# Patient Record
Sex: FEMALE | Race: WHITE | NOT HISPANIC OR LATINO | Employment: UNEMPLOYED | URBAN - METROPOLITAN AREA
[De-identification: names, ages, dates, MRNs, and addresses within clinical notes are randomized per-mention and may not be internally consistent; named-entity substitution may affect disease eponyms.]

---

## 2022-07-14 ENCOUNTER — OFFICE VISIT (OUTPATIENT)
Dept: FAMILY MEDICINE CLINIC | Facility: CLINIC | Age: 32
End: 2022-07-14

## 2022-07-14 VITALS
DIASTOLIC BLOOD PRESSURE: 74 MMHG | TEMPERATURE: 98 F | HEIGHT: 65 IN | BODY MASS INDEX: 18.83 KG/M2 | OXYGEN SATURATION: 94 % | HEART RATE: 125 BPM | WEIGHT: 113 LBS | SYSTOLIC BLOOD PRESSURE: 104 MMHG

## 2022-07-14 DIAGNOSIS — R53.1 GENERALIZED WEAKNESS: ICD-10-CM

## 2022-07-14 DIAGNOSIS — Z00.01 ENCOUNTER FOR GENERAL ADULT MEDICAL EXAMINATION WITH ABNORMAL FINDINGS: Primary | ICD-10-CM

## 2022-07-14 DIAGNOSIS — F41.9 ANXIETY: ICD-10-CM

## 2022-07-14 DIAGNOSIS — G62.1 ALCOHOL-INDUCED POLYNEUROPATHY: ICD-10-CM

## 2022-07-14 LAB
ALBUMIN SERPL-MCNC: 3.9 G/DL (ref 3.5–5.2)
ALBUMIN/GLOB SERPL: 1.2 G/DL
ALP SERPL-CCNC: 185 U/L (ref 39–117)
ALT SERPL W P-5'-P-CCNC: 77 U/L (ref 1–33)
ANION GAP SERPL CALCULATED.3IONS-SCNC: 14.9 MMOL/L (ref 5–15)
ANISOCYTOSIS BLD QL: ABNORMAL
AST SERPL-CCNC: 134 U/L (ref 1–32)
BILIRUB SERPL-MCNC: 0.3 MG/DL (ref 0–1.2)
BUN SERPL-MCNC: 8 MG/DL (ref 6–20)
BUN/CREAT SERPL: 14 (ref 7–25)
CALCIUM SPEC-SCNC: 8.8 MG/DL (ref 8.6–10.5)
CHLORIDE SERPL-SCNC: 100 MMOL/L (ref 98–107)
CHOLEST SERPL-MCNC: 128 MG/DL (ref 0–200)
CO2 SERPL-SCNC: 23.1 MMOL/L (ref 22–29)
CREAT SERPL-MCNC: 0.57 MG/DL (ref 0.57–1)
DEPRECATED RDW RBC AUTO: 54.4 FL (ref 37–54)
EGFRCR SERPLBLD CKD-EPI 2021: 122.5 ML/MIN/1.73
EOSINOPHIL # BLD MANUAL: 0.64 10*3/MM3 (ref 0–0.4)
EOSINOPHIL NFR BLD MANUAL: 6.5 % (ref 0.3–6.2)
ERYTHROCYTE [DISTWIDTH] IN BLOOD BY AUTOMATED COUNT: 14.1 % (ref 12.3–15.4)
GLOBULIN UR ELPH-MCNC: 3.2 GM/DL
GLUCOSE SERPL-MCNC: 135 MG/DL (ref 65–99)
HCT VFR BLD AUTO: 47.9 % (ref 34–46.6)
HDLC SERPL-MCNC: 33 MG/DL (ref 40–60)
HGB BLD-MCNC: 17.3 G/DL (ref 12–15.9)
LDLC SERPL CALC-MCNC: 67 MG/DL (ref 0–100)
LDLC/HDLC SERPL: 1.91 {RATIO}
LYMPHOCYTES # BLD MANUAL: 1.92 10*3/MM3 (ref 0.7–3.1)
LYMPHOCYTES NFR BLD MANUAL: 10.9 % (ref 5–12)
MACROCYTES BLD QL SMEAR: ABNORMAL
MCH RBC QN AUTO: 39 PG (ref 26.6–33)
MCHC RBC AUTO-ENTMCNC: 36.1 G/DL (ref 31.5–35.7)
MCV RBC AUTO: 107.9 FL (ref 79–97)
MONOCYTES # BLD: 1.07 10*3/MM3 (ref 0.1–0.9)
NEUTROPHILS # BLD AUTO: 6.19 10*3/MM3 (ref 1.7–7)
NEUTROPHILS NFR BLD MANUAL: 63 % (ref 42.7–76)
NRBC BLD AUTO-RTO: 0.1 /100 WBC (ref 0–0.2)
PLAT MORPH BLD: NORMAL
PLATELET # BLD AUTO: 347 10*3/MM3 (ref 140–450)
PMV BLD AUTO: 10.4 FL (ref 6–12)
POTASSIUM SERPL-SCNC: 3.8 MMOL/L (ref 3.5–5.2)
PROT SERPL-MCNC: 7.1 G/DL (ref 6–8.5)
RBC # BLD AUTO: 4.44 10*6/MM3 (ref 3.77–5.28)
SODIUM SERPL-SCNC: 138 MMOL/L (ref 136–145)
TRIGL SERPL-MCNC: 160 MG/DL (ref 0–150)
TSH SERPL DL<=0.05 MIU/L-ACNC: 3.72 UIU/ML (ref 0.27–4.2)
VARIANT LYMPHS NFR BLD MANUAL: 19.6 % (ref 19.6–45.3)
VIT B12 BLD-MCNC: 278 PG/ML (ref 211–946)
VLDLC SERPL-MCNC: 28 MG/DL (ref 5–40)
WBC MORPH BLD: NORMAL
WBC NRBC COR # BLD: 9.82 10*3/MM3 (ref 3.4–10.8)

## 2022-07-14 PROCEDURE — 85007 BL SMEAR W/DIFF WBC COUNT: CPT | Performed by: NURSE PRACTITIONER

## 2022-07-14 PROCEDURE — 36415 COLL VENOUS BLD VENIPUNCTURE: CPT | Performed by: NURSE PRACTITIONER

## 2022-07-14 PROCEDURE — 80050 GENERAL HEALTH PANEL: CPT | Performed by: NURSE PRACTITIONER

## 2022-07-14 PROCEDURE — 99204 OFFICE O/P NEW MOD 45 MIN: CPT | Performed by: NURSE PRACTITIONER

## 2022-07-14 PROCEDURE — 82607 VITAMIN B-12: CPT | Performed by: NURSE PRACTITIONER

## 2022-07-14 PROCEDURE — 80061 LIPID PANEL: CPT | Performed by: NURSE PRACTITIONER

## 2022-07-14 RX ORDER — GABAPENTIN 300 MG/1
300 CAPSULE ORAL 3 TIMES DAILY
Qty: 90 CAPSULE | Refills: 6 | Status: SHIPPED | OUTPATIENT
Start: 2022-07-14

## 2022-07-14 RX ORDER — SERTRALINE HYDROCHLORIDE 25 MG/1
25 TABLET, FILM COATED ORAL DAILY
COMMUNITY
End: 2022-07-14 | Stop reason: SDUPTHER

## 2022-07-14 RX ORDER — GABAPENTIN 300 MG/1
300 CAPSULE ORAL 3 TIMES DAILY
COMMUNITY
End: 2022-07-14 | Stop reason: SDUPTHER

## 2022-07-14 RX ORDER — SERTRALINE HYDROCHLORIDE 25 MG/1
25 TABLET, FILM COATED ORAL DAILY
Qty: 30 TABLET | Refills: 6 | Status: SHIPPED | OUTPATIENT
Start: 2022-07-14

## 2022-07-14 NOTE — PROGRESS NOTES
Venipuncture Blood Specimen Collection  Venipuncture performed in left arm by Malou Sahu MA with good hemostasis. Patient tolerated the procedure well without complications.   07/14/22   Malou Sahu MA

## 2022-07-14 NOTE — PROGRESS NOTES
"Subjective   Lori Ling is a 32 y.o. female presents for   Chief Complaint   Patient presents with   • Establish Care   • Peripheral Neuropathy       Health Maintenance Due   Topic Date Due   • COVID-19 Vaccine (1) Never done   • Pneumococcal Vaccine 0-64 (1 - PCV) Never done   • TDAP/TD VACCINES (1 - Tdap) Never done       History of Present Illness   Pt present for new patient exam to establish care. She was admitted 4/14/22 for neuropathy sx at [facility].   She reports hx of peripheral neuropathy and needs refills of gabapentin.  She states she was told neuropathy was from alcohol use.  She is currently working to step down alcohol use. She was offered rehab stay and additional resources during hospitalization for alcohol abuse and she declined.  She states she can manage at home.  She is also taking zoloft for depression and states it is working well.  She denies problems or side effects.  She states she has had tachycardia since hospitalization.    She reports generalized weakness and unable to do most of her ADLs.  She states she has numbness and tingling throughout her body and would like to see physical therapy.     Vitals:    07/14/22 1101   BP: 104/74   BP Location: Right arm   Patient Position: Sitting   Cuff Size: Adult   Pulse: (!) 125   Temp: 98 °F (36.7 °C)   TempSrc: Temporal   SpO2: 94%   Weight: 51.3 kg (113 lb)   Height: 165.1 cm (65\")     Body mass index is 18.8 kg/m².    No current outpatient medications on file prior to visit.     No current facility-administered medications on file prior to visit.       The following portions of the patient's history were reviewed and updated as appropriate: allergies, current medications, past family history, past medical history, past social history, past surgical history, and problem list.    Review of Systems   Constitutional: Negative for chills and fever.   HENT: Negative for sinus pressure and sore throat.    Eyes: Negative for blurred vision. "   Respiratory: Negative for cough and shortness of breath.    Cardiovascular: Negative for chest pain.   Gastrointestinal: Negative for abdominal pain.   Endocrine: Negative.    Genitourinary: Negative.    Musculoskeletal: Negative for arthralgias and joint swelling.   Skin: Negative for color change.   Neurological: Positive for weakness and numbness. Negative for dizziness.   Psychiatric/Behavioral: Negative for behavioral problems.       Objective   Physical Exam  Vitals and nursing note reviewed.   Constitutional:       Appearance: Normal appearance. She is well-developed.   HENT:      Head: Normocephalic and atraumatic.      Right Ear: External ear normal.      Left Ear: External ear normal.      Nose: Nose normal.   Eyes:      Extraocular Movements: Extraocular movements intact.      Pupils: Pupils are equal, round, and reactive to light.   Cardiovascular:      Rate and Rhythm: Normal rate and regular rhythm.      Pulses: Normal pulses.      Heart sounds: Normal heart sounds.   Pulmonary:      Effort: Pulmonary effort is normal.      Breath sounds: Normal breath sounds.   Abdominal:      General: Bowel sounds are normal.      Palpations: Abdomen is soft.   Genitourinary:     Vagina: Normal.   Musculoskeletal:         General: Normal range of motion.      Cervical back: Normal range of motion and neck supple.      Comments: Lower extremities very tender to palpation   Skin:     General: Skin is warm and dry.   Neurological:      General: No focal deficit present.      Mental Status: She is alert and oriented to person, place, and time.      Motor: Weakness (generalized-in wheelchair) present.   Psychiatric:         Mood and Affect: Mood normal.         Behavior: Behavior normal.         Judgment: Judgment normal.       PHQ-9 Total Score:      Assessment & Plan   Diagnoses and all orders for this visit:    1. Encounter for general adult medical examination with abnormal findings (Primary)  -     CBC Auto  Differential  -     Comprehensive Metabolic Panel  -     Lipid Panel  -     TSH  -     Vitamin B12  -     Manual Differential    2. Alcohol-induced polyneuropathy (HCC)  -     Ambulatory Referral to Neurology  -     Ambulatory Referral to Physical Therapy Evaluate and treat, Neuro; Stretching, ROM, Strengthening  -     gabapentin (NEURONTIN) 300 MG capsule; Take 1 capsule by mouth 3 (Three) Times a Day.  Dispense: 90 capsule; Refill: 6    3. Generalized weakness  -     Ambulatory Referral to Physical Therapy Evaluate and treat, Neuro; Stretching, ROM, Strengthening    4. Anxiety  -     sertraline (ZOLOFT) 25 MG tablet; Take 1 tablet by mouth Daily.  Dispense: 30 tablet; Refill: 6        There are no Patient Instructions on file for this visit.

## 2022-07-15 ENCOUNTER — TELEPHONE (OUTPATIENT)
Dept: FAMILY MEDICINE CLINIC | Facility: CLINIC | Age: 32
End: 2022-07-15

## 2022-07-15 NOTE — TELEPHONE ENCOUNTER
HUB TO READ:  ----- Message from [facility] sent at 7/15/2022 12:21 PM EDT -----  Pt liver functions are very elevated.  Encourage her to continue to cut back on alcohol use and avoid tylenol containing products.  Please obtain labs and records from [facility] in March and April to compare labs.    Hemoglobin and hematocrit are both elevated.  It may be related to smoking, but please ask if she has ever had this worked up in the past.   Triglycerides are also elevated slightly-encourage her to limit carbs and exercise as tolerated.

## 2023-01-30 ENCOUNTER — OFFICE VISIT (OUTPATIENT)
Dept: FAMILY MEDICINE CLINIC | Facility: CLINIC | Age: 33
End: 2023-01-30
Payer: MEDICAID

## 2023-01-30 VITALS
WEIGHT: 111.4 LBS | TEMPERATURE: 98 F | OXYGEN SATURATION: 98 % | BODY MASS INDEX: 18.56 KG/M2 | HEART RATE: 106 BPM | DIASTOLIC BLOOD PRESSURE: 84 MMHG | HEIGHT: 65 IN | SYSTOLIC BLOOD PRESSURE: 114 MMHG

## 2023-01-30 DIAGNOSIS — G62.1 ALCOHOL-INDUCED POLYNEUROPATHY: ICD-10-CM

## 2023-01-30 DIAGNOSIS — K21.9 GASTROESOPHAGEAL REFLUX DISEASE, UNSPECIFIED WHETHER ESOPHAGITIS PRESENT: ICD-10-CM

## 2023-01-30 DIAGNOSIS — R00.0 TACHYCARDIA: Primary | ICD-10-CM

## 2023-01-30 DIAGNOSIS — Z30.431 ENCOUNTER FOR MANAGEMENT OF INTRAUTERINE CONTRACEPTIVE DEVICE (IUD), UNSPECIFIED IUD MANAGEMENT TYPE: ICD-10-CM

## 2023-01-30 PROCEDURE — 99214 OFFICE O/P EST MOD 30 MIN: CPT | Performed by: NURSE PRACTITIONER

## 2023-01-30 PROCEDURE — 93000 ELECTROCARDIOGRAM COMPLETE: CPT | Performed by: NURSE PRACTITIONER

## 2023-01-30 RX ORDER — METOPROLOL TARTRATE 100 MG/1
25 TABLET ORAL
COMMUNITY

## 2023-01-30 RX ORDER — LANOLIN ALCOHOL/MO/W.PET/CERES
50 CREAM (GRAM) TOPICAL DAILY
COMMUNITY

## 2023-01-30 RX ORDER — PANTOPRAZOLE SODIUM 40 MG/1
40 TABLET, DELAYED RELEASE ORAL DAILY
Qty: 30 TABLET | Refills: 6 | Status: SHIPPED | OUTPATIENT
Start: 2023-01-30 | End: 2023-03-01

## 2023-01-30 NOTE — PROGRESS NOTES
"Subjective   Lori Ling is a 32 y.o. female presents for   Chief Complaint   Patient presents with   • Neurologic Problem     Following up on neuropathy  Patient is not fasting  Decline's FLU shot  NO additonal concerns.       Health Maintenance Due   Topic Date Due   • COVID-19 Vaccine (1) Never done   • Pneumococcal Vaccine 0-64 (1 - PCV) Never done   • TDAP/TD VACCINES (1 - Tdap) Never done   • INFLUENZA VACCINE  Never done       History of Present Illness   Pt present to follow up neuropathy.  She is taking medications as directed and states it is helping with neuropathy.  She states it helps manage the pain but numbness is still present.  She has appt with neurology this week.    She is also taking metoprolol as directed and HR still elevated, more elevated than last appt.  She has never seen cardiology for tachycardia.  She reports rare slight chest pain for the past 6 months and states it occurs every 2-3 weeks.  She denies caffeine or drug use.  She is a currently daily smoker. Last EKG 2 years ago.    She reports she has been taking prilosec or nexium for GERD sx and states they tend to cause ibs symptoms but cannot stop them due to significant GERD.  She requests to try a different GERD medication.   She requests referral to gynacology for mirena removal.  She state she has had it for 10 years.   Vitals:    01/30/23 1438 01/30/23 1525   BP: 114/84    BP Location: Right arm    Patient Position: Sitting    Cuff Size: Adult    Pulse: (!) 140 106   Temp: 98 °F (36.7 °C)    TempSrc: Temporal    SpO2: 98%    Weight: 50.5 kg (111 lb 6.4 oz)    Height: 165.1 cm (65\")      Body mass index is 18.54 kg/m².    Current Outpatient Medications on File Prior to Visit   Medication Sig Dispense Refill   • gabapentin (NEURONTIN) 300 MG capsule Take 1 capsule by mouth 3 (Three) Times a Day. 90 capsule 6   • metoprolol tartrate (LOPRESSOR) 100 MG tablet Take 25 mg by mouth.     • sertraline (ZOLOFT) 25 MG tablet Take 1 " tablet by mouth Daily. 30 tablet 6   • thiamine (VITAMIN B1) 100 MG tablet Take 50 mg by mouth Daily.     • [DISCONTINUED] Omeprazole Magnesium (PRILOSEC PO)   0 Refill(s)       No current facility-administered medications on file prior to visit.       The following portions of the patient's history were reviewed and updated as appropriate: allergies, current medications, past family history, past medical history, past social history, past surgical history, and problem list.    Review of Systems   Constitutional: Negative for chills and fever.   HENT: Negative for sinus pressure and sore throat.    Eyes: Negative for blurred vision.   Respiratory: Negative for cough and shortness of breath.    Cardiovascular: Positive for chest pain.   Gastrointestinal: Positive for GERD. Negative for abdominal pain.   Endocrine: Negative.    Genitourinary:        Need for mirena removal   Musculoskeletal: Negative for arthralgias and joint swelling.   Skin: Negative for color change.   Allergic/Immunologic: Negative.    Neurological: Negative for dizziness.   Hematological: Negative.    Psychiatric/Behavioral: Negative for behavioral problems.       Objective   Physical Exam  Vitals and nursing note reviewed.   Constitutional:       Appearance: Normal appearance. She is well-developed.   HENT:      Head: Normocephalic and atraumatic.      Right Ear: External ear normal.      Left Ear: External ear normal.      Nose: Nose normal.   Eyes:      Extraocular Movements: Extraocular movements intact.      Pupils: Pupils are equal, round, and reactive to light.   Cardiovascular:      Rate and Rhythm: Regular rhythm. Tachycardia present.      Pulses: Normal pulses.      Heart sounds: Normal heart sounds.   Pulmonary:      Effort: Pulmonary effort is normal.      Breath sounds: Normal breath sounds.   Abdominal:      General: Bowel sounds are normal.      Palpations: Abdomen is soft.   Genitourinary:     Vagina: Normal.   Musculoskeletal:          General: Normal range of motion.      Cervical back: Normal range of motion and neck supple.   Skin:     General: Skin is warm and dry.   Neurological:      General: No focal deficit present.      Mental Status: She is alert and oriented to person, place, and time.      Motor: Weakness present.      Gait: Gait abnormal.   Psychiatric:         Mood and Affect: Mood normal.         Behavior: Behavior normal.         Judgment: Judgment normal.       PHQ-9 Total Score:      Assessment & Plan   Diagnoses and all orders for this visit:    1. Tachycardia (Primary)  Comments:  continue metoprolol and f/u cardiololgy, avoid caffeine and encouraged to stop smoking.  rate improves with rest.   Orders:  -     Ambulatory Referral to Cardiology  -     ECG 12 Lead    2. Encounter for management of intrauterine contraceptive device (IUD), unspecified IUD management type  -     Ambulatory Referral to Gynecology    3. Gastroesophageal reflux disease, unspecified whether esophagitis present  -     pantoprazole (Protonix) 40 MG EC tablet; Take 1 tablet by mouth Daily for 30 days.  Dispense: 30 tablet; Refill: 6    4. Alcohol-induced polyneuropathy (HCC)  Comments:  continue gabapentin and follow up with neurology         There are no Patient Instructions on file for this visit.

## 2023-01-30 NOTE — PROGRESS NOTES
Procedure     ECG 12 Lead    Date/Time: 1/30/2023 4:44 PM  Performed by: Bindu Rutherford APRN  Authorized by: Bindu Rutherford APRN   Comparison: not compared with previous ECG   Previous ECG: no previous ECG available  Rhythm: sinus tachycardia  Rate: tachycardic  Conduction: conduction normal  ST Segments: ST segments normal  T Waves: T waves normal  QRS axis: normal    Clinical impression: abnormal EKG

## 2023-01-31 ENCOUNTER — PRIOR AUTHORIZATION (OUTPATIENT)
Dept: FAMILY MEDICINE CLINIC | Facility: CLINIC | Age: 33
End: 2023-01-31
Payer: MEDICAID

## 2023-02-01 ENCOUNTER — OFFICE VISIT (OUTPATIENT)
Dept: NEUROLOGY | Facility: CLINIC | Age: 33
End: 2023-02-01
Payer: MEDICAID

## 2023-02-01 VITALS
HEART RATE: 134 BPM | TEMPERATURE: 97.3 F | HEIGHT: 65 IN | BODY MASS INDEX: 18.54 KG/M2 | SYSTOLIC BLOOD PRESSURE: 103 MMHG | DIASTOLIC BLOOD PRESSURE: 75 MMHG

## 2023-02-01 DIAGNOSIS — G62.9 POLYNEUROPATHY: Primary | Chronic | ICD-10-CM

## 2023-02-01 PROBLEM — R74.01 TRANSAMINITIS: Status: ACTIVE | Noted: 2021-03-17

## 2023-02-01 PROBLEM — F10.10 ALCOHOL ABUSE: Status: ACTIVE | Noted: 2021-03-17

## 2023-02-01 PROBLEM — F32.A DEPRESSION: Status: ACTIVE | Noted: 2023-02-01

## 2023-02-01 PROBLEM — R20.2 PARESTHESIA: Status: ACTIVE | Noted: 2021-03-16

## 2023-02-01 PROBLEM — E87.6 HYPOKALEMIA: Status: ACTIVE | Noted: 2021-03-17

## 2023-02-01 PROBLEM — I10 ESSENTIAL HYPERTENSION: Status: ACTIVE | Noted: 2021-03-17

## 2023-02-01 PROBLEM — Z72.0 TOBACCO ABUSE: Status: ACTIVE | Noted: 2021-03-17

## 2023-02-01 PROBLEM — I10 HTN (HYPERTENSION): Status: ACTIVE | Noted: 2023-02-01

## 2023-02-01 PROBLEM — K21.9 GERD (GASTROESOPHAGEAL REFLUX DISEASE): Status: ACTIVE | Noted: 2021-03-17

## 2023-02-01 PROCEDURE — 99215 OFFICE O/P EST HI 40 MIN: CPT | Performed by: NURSE PRACTITIONER

## 2023-02-21 PROBLEM — R00.0 TACHYCARDIA: Status: ACTIVE | Noted: 2023-02-21

## 2023-02-24 ENCOUNTER — TELEPHONE (OUTPATIENT)
Dept: CARDIOLOGY | Facility: CLINIC | Age: 33
End: 2023-02-24
Payer: MEDICAID

## 2023-02-24 NOTE — TELEPHONE ENCOUNTER
Caller: Lori Ling    Relationship to patient: Self    Best call back number: [phone]    Patient is needing: PT MISSED NEW PT APPOINTMENT ON 2.22.23 SHE IS REQUESTING TO BE RESCHEDULED, ONLY AVAILABLE FOR NEW PT IS 5.18.23

## 2023-03-16 ENCOUNTER — TELEPHONE (OUTPATIENT)
Dept: CARDIOLOGY | Facility: CLINIC | Age: 33
End: 2023-03-16

## 2023-03-16 NOTE — TELEPHONE ENCOUNTER
Caller: Lori Ling    Relationship: Self    Best call back number: [phone]      PATIENT RESCHEDULED 3.16.23 APPT FOR BEING SICK, RSD TO NEXT AVAILABLE 6.1.23. PLEASE CALL PATIENT TO ADVISE OF SOONER APPT IN NEEDED.

## 2023-04-18 NOTE — PROGRESS NOTES
Date of Office Visit: 2023  Encounter Provider: Dr. Florin Liao  Place of Service: [facility] CARDIOLOG[facility] [place]  Patient Name: Lori Ling  :1990  Bindu Rutherford APRN    Chief Complaint   Patient presents with   • Rapid Heart Rate   • Hypertension   • Consult     History of Present Illness:    I am pleased to see Mrs. Ling in my office today as a new consultation.    As you know, patient is 33-year-old white female whose past medical history is significant for alcohol abuse, alcoholic polyneuropathy, immobility syndrome, who is referred to me for symptom of palpitation and sinus tachycardia.    Patient reports that she has severe alcohol abuse.  She used to drink 16 glasses of wine every day.  However she developed polyneuropathy.  She is unable to walk for long distance.  She is used cane or walker.  She is wheelchair-bound.  From last 6 to 12 months, she has noticed her heart rate is getting high.  She is noted to have sinus tachycardia.  Patient denies any chest pain.  Patient is short of breath.  Patient does not have leg edema.  JVD is negative.  No crackles on examination.    Patient does not have previous history of diabetes mellitus.  Patient smokes half a pack of cigarettes per day.  Patient drinks 5 to 6 glasses of wine.    EKG showed sinus tachycardia.    I would recommend to proceed with echocardiogram.  I would add midodrine 2.5 mg 3 times a day.  Increase Lopressor 25 mg twice daily.  Consider ivabradine.        Past Medical History:   Diagnosis Date   • Hypertension    • Tachycardia          History reviewed. No pertinent surgical history.        Current Outpatient Medications:   •  gabapentin (NEURONTIN) 300 MG capsule, Take 1 capsule by mouth 3 (Three) Times a Day., Disp: 90 capsule, Rfl: 6  •  metoprolol tartrate (LOPRESSOR) 25 MG tablet, Take 1 tablet by mouth 2 (Two) Times a Day., Disp: 180 tablet, Rfl: 1  •  pantoprazole (PROTONIX) 20 MG EC tablet, Take 1 tablet by  "mouth Daily., Disp: , Rfl:   •  sertraline (ZOLOFT) 25 MG tablet, Take 1 tablet by mouth Daily., Disp: 30 tablet, Rfl: 6  •  midodrine (PROAMATINE) 2.5 MG tablet, Take 1 tablet by mouth 3 (Three) Times a Day Before Meals., Disp: 90 tablet, Rfl: 1      Social History     Socioeconomic History   • Marital status:    Tobacco Use   • Smoking status: Every Day     Packs/day: 1.00     Years: 18.00     Pack years: 18.00     Types: Cigarettes     Start date: 2004   • Smokeless tobacco: Never   Vaping Use   • Vaping Use: Never used   Substance and Sexual Activity   • Alcohol use: Yes     Alcohol/week: 6.0 standard drinks     Types: 6 Glasses of wine per week   • Drug use: Not Currently   • Sexual activity: Defer         Review of Systems   Constitutional: Negative for chills and fever.   HENT: Negative for ear discharge and nosebleeds.    Eyes: Negative for discharge and redness.   Cardiovascular: Positive for palpitations. Negative for chest pain, orthopnea, paroxysmal nocturnal dyspnea and syncope.   Respiratory: Positive for shortness of breath. Negative for cough and wheezing.    Endocrine: Negative for heat intolerance.   Skin: Negative for rash.   Musculoskeletal: Negative for arthritis and myalgias.   Gastrointestinal: Negative for abdominal pain, melena, nausea and vomiting.   Genitourinary: Negative for dysuria and hematuria.   Neurological: Positive for numbness. Negative for dizziness, light-headedness and tremors.   Psychiatric/Behavioral: Negative for depression. The patient is not nervous/anxious.        Procedures    SCANNED EKG    Date/Time: 4/19/2023 1:18 PM  Performed by: Florin Liao MD  Authorized by: Bindu Rutherford APRN           No orders to display           Objective:    /79 (BP Location: Right arm, Patient Position: Sitting, Cuff Size: Adult)   Pulse (!) 132   Ht 165.1 cm (65\")   Wt 49.9 kg (110 lb)   SpO2 99%   BMI 18.30 kg/m²         Constitutional:       Appearance: " Well-developed.   Eyes:      General: No scleral icterus.        Right eye: No discharge.   HENT:      Head: Normocephalic and atraumatic.   Neck:      Thyroid: No thyromegaly.      Lymphadenopathy: No cervical adenopathy.   Pulmonary:      Effort: Pulmonary effort is normal. No respiratory distress.      Breath sounds: Normal breath sounds. No wheezing. No rales.   Cardiovascular:      Normal rate. Regular rhythm.      No gallop.   Abdominal:      Tenderness: There is no abdominal tenderness.   Skin:     Findings: No erythema or rash.   Neurological:      Mental Status: Alert and oriented to person, place, and time.             Assessment:       Diagnosis Plan   1. Essential hypertension  pantoprazole (PROTONIX) 20 MG EC tablet    midodrine (PROAMATINE) 2.5 MG tablet    Adult Transthoracic Echo Complete W/ Cont if Necessary Per Protocol    metoprolol tartrate (LOPRESSOR) 25 MG tablet      2. Tachycardia  pantoprazole (PROTONIX) 20 MG EC tablet    midodrine (PROAMATINE) 2.5 MG tablet    Adult Transthoracic Echo Complete W/ Cont if Necessary Per Protocol    metoprolol tartrate (LOPRESSOR) 25 MG tablet      3. Shortness of breath  pantoprazole (PROTONIX) 20 MG EC tablet    midodrine (PROAMATINE) 2.5 MG tablet    Adult Transthoracic Echo Complete W/ Cont if Necessary Per Protocol    metoprolol tartrate (LOPRESSOR) 25 MG tablet      4. Chronic hypotension  pantoprazole (PROTONIX) 20 MG EC tablet    midodrine (PROAMATINE) 2.5 MG tablet    metoprolol tartrate (LOPRESSOR) 25 MG tablet               Plan:       MDM:    1.  Tachycardia:    I would increase Lopressor to 25 mg twice daily.    2.  Hypotension:    I would add midodrine    3.  Shortness of breath:    Proceed with echocardiogram.

## 2023-04-19 ENCOUNTER — OFFICE VISIT (OUTPATIENT)
Dept: CARDIOLOGY | Facility: CLINIC | Age: 33
End: 2023-04-19
Payer: MEDICAID

## 2023-04-19 VITALS
HEART RATE: 132 BPM | SYSTOLIC BLOOD PRESSURE: 101 MMHG | BODY MASS INDEX: 18.33 KG/M2 | DIASTOLIC BLOOD PRESSURE: 79 MMHG | OXYGEN SATURATION: 99 % | WEIGHT: 110 LBS | HEIGHT: 65 IN

## 2023-04-19 DIAGNOSIS — I95.89 CHRONIC HYPOTENSION: ICD-10-CM

## 2023-04-19 DIAGNOSIS — R00.0 TACHYCARDIA: ICD-10-CM

## 2023-04-19 DIAGNOSIS — I10 ESSENTIAL HYPERTENSION: Primary | ICD-10-CM

## 2023-04-19 DIAGNOSIS — R06.02 SHORTNESS OF BREATH: ICD-10-CM

## 2023-04-19 PROCEDURE — 1159F MED LIST DOCD IN RCRD: CPT | Performed by: INTERNAL MEDICINE

## 2023-04-19 PROCEDURE — 3078F DIAST BP <80 MM HG: CPT | Performed by: INTERNAL MEDICINE

## 2023-04-19 PROCEDURE — 3074F SYST BP LT 130 MM HG: CPT | Performed by: INTERNAL MEDICINE

## 2023-04-19 PROCEDURE — 1160F RVW MEDS BY RX/DR IN RCRD: CPT | Performed by: INTERNAL MEDICINE

## 2023-04-19 PROCEDURE — 99204 OFFICE O/P NEW MOD 45 MIN: CPT | Performed by: INTERNAL MEDICINE

## 2023-04-19 RX ORDER — MIDODRINE HYDROCHLORIDE 2.5 MG/1
2.5 TABLET ORAL
Qty: 90 TABLET | Refills: 1 | Status: SHIPPED | OUTPATIENT
Start: 2023-04-19

## 2023-04-19 RX ORDER — PANTOPRAZOLE SODIUM 20 MG/1
20 TABLET, DELAYED RELEASE ORAL DAILY
COMMUNITY

## 2023-06-17 DIAGNOSIS — G62.1 ALCOHOL-INDUCED POLYNEUROPATHY: ICD-10-CM

## 2023-06-18 NOTE — TELEPHONE ENCOUNTER
Rx Refill Note  Requested Prescriptions     Pending Prescriptions Disp Refills   • gabapentin (NEURONTIN) 300 MG capsule [Pharmacy Med Name: GABAPENTIN 300 MG CAPSULE] 90 capsule 6     Sig: TAKE ONE CAPSULE BY MOUTH THREE TIMES A DAY      Last office visit with prescribing clinician: 1/30/2023      Next office visit with prescribing clinician: Visit date not found   3}  Brenna Rodriguez  06/17/23, 22:35 EDT

## 2023-06-19 RX ORDER — GABAPENTIN 300 MG/1
CAPSULE ORAL
Qty: 90 CAPSULE | Refills: 6 | Status: SHIPPED | OUTPATIENT
Start: 2023-06-19

## 2023-08-16 ENCOUNTER — OFFICE VISIT (OUTPATIENT)
Dept: NEUROLOGY | Facility: CLINIC | Age: 33
End: 2023-08-16
Payer: MEDICAID

## 2023-08-16 VITALS
HEIGHT: 65 IN | BODY MASS INDEX: 18.3 KG/M2 | DIASTOLIC BLOOD PRESSURE: 70 MMHG | HEART RATE: 136 BPM | SYSTOLIC BLOOD PRESSURE: 92 MMHG

## 2023-08-16 DIAGNOSIS — G62.1 ALCOHOL-INDUCED POLYNEUROPATHY: ICD-10-CM

## 2023-08-16 DIAGNOSIS — G62.9 POLYNEUROPATHY: Primary | ICD-10-CM

## 2023-08-16 DIAGNOSIS — R20.2 PARESTHESIA: ICD-10-CM

## 2023-08-16 PROCEDURE — 3074F SYST BP LT 130 MM HG: CPT | Performed by: PSYCHIATRY & NEUROLOGY

## 2023-08-16 PROCEDURE — 1160F RVW MEDS BY RX/DR IN RCRD: CPT | Performed by: PSYCHIATRY & NEUROLOGY

## 2023-08-16 PROCEDURE — 1159F MED LIST DOCD IN RCRD: CPT | Performed by: PSYCHIATRY & NEUROLOGY

## 2023-08-16 PROCEDURE — 3078F DIAST BP <80 MM HG: CPT | Performed by: PSYCHIATRY & NEUROLOGY

## 2023-08-16 PROCEDURE — 99214 OFFICE O/P EST MOD 30 MIN: CPT | Performed by: PSYCHIATRY & NEUROLOGY

## 2023-08-16 RX ORDER — GABAPENTIN 300 MG/1
CAPSULE ORAL
Qty: 90 CAPSULE | Refills: 6 | Status: SHIPPED | OUTPATIENT
Start: 2023-08-16 | End: 2023-08-16 | Stop reason: SDUPTHER

## 2023-08-16 RX ORDER — PANTOPRAZOLE SODIUM 40 MG/1
TABLET, DELAYED RELEASE ORAL
COMMUNITY
Start: 2023-07-22

## 2023-08-16 RX ORDER — GABAPENTIN 300 MG/1
CAPSULE ORAL
Qty: 120 CAPSULE | Refills: 11 | Status: SHIPPED | OUTPATIENT
Start: 2023-08-16

## 2023-08-16 NOTE — PROGRESS NOTES
"Chief Complaint  Follow-up (POLYNEUROPATHY )    Subjective          Lori Ling presents to [facility] NEUROLOGY for POLYNEUROPATHY  History of Present Illness  Patient is here to f/u on polyneuropathy she states she has more numbness in her legs since last visit   and a lot more pain at night she is     currently taking gabapentin 300 mg tid ( prescribed by her pcp)    Has involuntary eversion of the right foot at times          PREV. OV 2/1/23 RICARDO WYMAN BLACK APRN=====  CHIEF COMPLAINT: Neuropathy      History of Present Illness Miss Ling is an extremely thin  female with a BMI of 18.54 who presented today with her  referred as a new patient for neuropathy that she states was from alcohol abuse.  The patient has had 2 admissions to [facility] for a complete lab, see care everywhere, and EMG study.  She also underwent treatments of IVIG thinking that she had a neuropathy that had an inflammatory neuropathy superimposed.  The patient's most recent admission to [facility] was on 4/14/2022.  She did undergo an EMG study at that time which showed a length dependent sensory peripheral neuropathy per Dr. Bo Aguilar.   She reports during these admissions she was told that her neuropathy was an alcohol induced neuropathy.  The patient states for approximately 3 years she was drinking 1 to 2 boxes of wine per day, \"drinking till I would pass out\".       The patient reports today that she has severe unrelenting burning tingling from upper chest to the rest of her body and below, she states she has difficulty urinating or having bowel movements because she cannot tell that she either needs to go to the restroom or has gone to the restroom.  She states she cannot walk more than a few steps due to pain in her feet.  She presented today riding in a wheelchair.  She states that the current gabapentin takes the edge off of her pain and that she does not want the gabapentin increased " from the current 300 mg 3 times daily.  She states that she has greatly decreased her alcohol intake but is still drinking some.  She was notified that the neuropathy could get worse if she does not stop drinking alcohol.                          Complaint: Severe neuropathy  Onset: 3 years ago  Location from chest down and in both arms from elbows down, some tingling in shoulders.  Quality:numbness and tingling  Severity:scale 1-10 current pain level 8  Duration:  all day  Frequency:every day  Timing:constant  Context:numbness tingling  Modifying factors:nothing  Associated Signs and symptoms: can't walk long distances  Current meds:gabapentin 300 mg 3 times daily     Testing:     MRI of the brain without contrast,   04/14/2022 at 2041 hours   HISTORY: Progressive weakness and numbness.       TECHNIQUE: Multiplanar and multisequence imaging was obtained of the brain on a 1.5 Claudia magnet. Images were obtained without IV gadolinium. However, images were obtained after a MRI of the lumbar spine with and without IV gadolinium.   COMPARISON: MRI of the brain performed on March 17, 2021   FINDINGS:     BRAIN: There is no evidence of infarct, hydrocephalus or mass-effect. The brain parenchyma is unremarkable. The ventricles are normal size and shape. Flow is demonstrated within the vertebrobasilar and carotid arteries. The orbits and pituitary gland are    within normal limits. There is no evidence of Chiari malformation or syrinx.  Mild-to-moderate mucosal thickening is seen in the left frontal sinus and left anterior ethmoid air cells. No mastoid or middle ear effusion is demonstrated.   IMPRESSION:   1. Negative MRI of the brain. There is no evidence of acute intracranial abnormality.   2. Chronic left frontal and ethmoid sinusitis     Dictated by: Zafar Nix M.D.     Images and Report reviewed and interpreted by: Zafar Nix M.D.    MRI lumbar spine with and without contrast,   04/14/2022 at 1910 hours  "  HISTORY: Demyelinating disease with spinal cord symptoms. Generalized weakness. Ataxia..       TECHNIQUE: Multiplanar multisequence imaging was obtained of the lumbar spine on a 3 Claudia magnet. Images were obtained with and without IV gadolinium.   COMPARISON: MRI of the thoracic and lumbar spine performed on March 17, 2021   IMPRESSION:   1. No evidence of signal abnormality within the conus. No pathologic contrast enhancement is noted of the conus or cauda equina.   2. Mild degenerative changes of the lumbar spine   Dictated by: Zafar Nix M.D.      Lumbar puncture 3/17/2021 see care everywhere  CT of the head negative CT abdomen pelvis from [facility] normal CT lumbar spine showed a left paracentral disc protrusion at T1-L1 resulting in mild left neuroforaminal narrowing at L5-S1 partially calcified central disc protrusion resulting in mild bilateral neuroforaminal narrowing.     ALT 49 AST 86, previous ALT 47 AST 74 see care everywhere     EMG completed 4/14/2022 by Bo Woodard  Conclusion: There is evidence for a length dependent sensory peripheral polyneuropathy.        Current Outpatient Medications:     gabapentin (NEURONTIN) 300 MG capsule, One in am, one in pm, and two at hs, Disp: 120 capsule, Rfl: 11    metoprolol tartrate (LOPRESSOR) 25 MG tablet, Take 1 tablet by mouth 2 (Two) Times a Day., Disp: 180 tablet, Rfl: 1    midodrine (PROAMATINE) 2.5 MG tablet, Take 1 tablet by mouth 3 (Three) Times a Day Before Meals., Disp: 90 tablet, Rfl: 1    pantoprazole (PROTONIX) 40 MG EC tablet, , Disp: , Rfl:     sertraline (ZOLOFT) 25 MG tablet, Take 1 tablet by mouth Daily., Disp: 30 tablet, Rfl: 6    Review of Systems   Musculoskeletal:  Positive for gait problem.   Neurological:  Positive for weakness and numbness.        Objective:    Vital Signs:   BP 92/70   Pulse (!) 136   Ht 165.1 cm (65\")   BMI 18.30 kg/mý     Physical Exam  Vitals reviewed.   Pulmonary:      Effort: Pulmonary effort is normal. No " respiratory distress.   Neurological:      Mental Status: She is alert and oriented to person, place, and time.      Sensory: Sensory deficit present.      Motor: Weakness present.      Gait: Gait abnormal.      Deep Tendon Reflexes: Reflexes abnormal.      Comments: 0 dtr at knees and ankles   Psychiatric:         Mood and Affect: Mood normal.      Result Review :                Neurologic Exam     Mental Status   Oriented to person, place, and time.       Assessment and Plan    Diagnoses and all orders for this visit:    1. Polyneuropathy (Primary)  -     Vitamin B12; Future  -     Folate; Future  -     Calcitriol (1,25 di-OH Vitamin D); Future  -     Vitamin E; Future  -     Vitamin B6; Future  -     Copper, Serum; Future    2. Paresthesia    3. Alcohol-induced polyneuropathy  -     Discontinue: gabapentin (NEURONTIN) 300 MG capsule; One in am, one in pm, and two at hs  Dispense: 90 capsule; Refill: 6  -     gabapentin (NEURONTIN) 300 MG capsule; One in am, one in pm, and two at hs  Dispense: 120 capsule; Refill: 11       Increase gabapentin at bedtime.    Check labs as the b-12 level was low last year            Follow Up   Return in about 1 year (around 8/16/2024).  Patient was given instructions and counseling regarding her condition or for health maintenance advice. Please see specific information pulled into the AVS if appropriate.     This document has been electronically signed by Joseph Seipel, MD on August 16, 2023 14:03 EDT

## 2023-09-20 DIAGNOSIS — F41.9 ANXIETY: ICD-10-CM

## 2023-09-20 RX ORDER — SERTRALINE HYDROCHLORIDE 25 MG/1
TABLET, FILM COATED ORAL
Qty: 90 TABLET | Refills: 0 | Status: SHIPPED | OUTPATIENT
Start: 2023-09-20

## 2023-09-23 DIAGNOSIS — R06.02 SHORTNESS OF BREATH: ICD-10-CM

## 2023-09-23 DIAGNOSIS — I95.89 CHRONIC HYPOTENSION: ICD-10-CM

## 2023-09-23 DIAGNOSIS — I10 ESSENTIAL HYPERTENSION: ICD-10-CM

## 2023-09-23 DIAGNOSIS — R00.0 TACHYCARDIA: ICD-10-CM

## 2023-09-25 RX ORDER — MIDODRINE HYDROCHLORIDE 2.5 MG/1
TABLET ORAL
Qty: 90 TABLET | Refills: 0 | Status: SHIPPED | OUTPATIENT
Start: 2023-09-25

## 2023-10-30 RX ORDER — PANTOPRAZOLE SODIUM 40 MG/1
40 TABLET, DELAYED RELEASE ORAL DAILY
Qty: 30 TABLET | Refills: 6 | Status: SHIPPED | OUTPATIENT
Start: 2023-10-30

## 2023-10-30 NOTE — TELEPHONE ENCOUNTER
Rx Refill Note  Requested Prescriptions     Pending Prescriptions Disp Refills   • pantoprazole (PROTONIX) 40 MG EC tablet [Pharmacy Med Name: PANTOPRAZOLE SOD DR 40 MG TAB] 30 tablet      Sig: TAKE ONE TABLET BY MOUTH DAILY      Last office visit with prescribing clinician: 1/30/2023   Last telemedicine visit with prescribing clinician: Visit date not found   Next office visit with prescribing clinician: Visit date not found       Would you like a call back once the refill request has been completed: [] Yes [] No    If the office needs to give you a call back, can they leave a voicemail: [] Yes [] No    Lynda Kenny MA  10/30/23, 08:17 EDT

## 2023-11-04 DIAGNOSIS — I95.89 CHRONIC HYPOTENSION: ICD-10-CM

## 2023-11-04 DIAGNOSIS — R00.0 TACHYCARDIA: ICD-10-CM

## 2023-11-04 DIAGNOSIS — I10 ESSENTIAL HYPERTENSION: ICD-10-CM

## 2023-11-04 DIAGNOSIS — R06.02 SHORTNESS OF BREATH: ICD-10-CM

## 2023-12-27 DIAGNOSIS — F41.9 ANXIETY: ICD-10-CM

## 2023-12-27 RX ORDER — SERTRALINE HYDROCHLORIDE 25 MG/1
25 TABLET, FILM COATED ORAL DAILY
Qty: 90 TABLET | Refills: 0 | Status: SHIPPED | OUTPATIENT
Start: 2023-12-27

## 2024-05-01 DIAGNOSIS — F41.9 ANXIETY: ICD-10-CM

## 2024-05-01 RX ORDER — SERTRALINE HYDROCHLORIDE 25 MG/1
25 TABLET, FILM COATED ORAL DAILY
Qty: 90 TABLET | Refills: 0 | Status: SHIPPED | OUTPATIENT
Start: 2024-05-01

## 2024-05-01 NOTE — TELEPHONE ENCOUNTER
Rx Refill Note  Requested Prescriptions     Pending Prescriptions Disp Refills   • sertraline (ZOLOFT) 25 MG tablet [Pharmacy Med Name: SERTRALINE HCL 25 MG TABLET] 90 tablet 0     Sig: TAKE 1 TABLET BY MOUTH DAILY      Last office visit with prescribing clinician: 1/30/2023      Next office visit with prescribing clinician: Visit date not found   3}  Brenna Rodriguez  05/01/24, 08:32 EDT

## 2024-08-20 DIAGNOSIS — G62.1 ALCOHOL-INDUCED POLYNEUROPATHY: ICD-10-CM

## 2024-08-20 RX ORDER — GABAPENTIN 400 MG/1
CAPSULE ORAL
Qty: 90 CAPSULE | Refills: 5 | Status: SHIPPED | OUTPATIENT
Start: 2024-08-20

## 2024-11-13 ENCOUNTER — TELEPHONE (OUTPATIENT)
Dept: NEUROLOGY | Facility: CLINIC | Age: 34
End: 2024-11-13

## 2024-11-13 DIAGNOSIS — G62.1 ALCOHOL-INDUCED POLYNEUROPATHY: ICD-10-CM

## 2024-11-13 NOTE — TELEPHONE ENCOUNTER
Caller: Lori Ling      Best call back number:   Telephone Information:   Mobile [phone]         Which medication are you concerned about: gabapentin (NEURONTIN) 400 MG capsule [[id number]]    Order Details  Dose, Route, Frequency: As Directed   Dispense Quantity: 90 capsule Refills: 5          Sig: One in am, one in pm, and two at hs         Start Date: 08/20/24 End Date: --   Written Date: 08/20/24 Expi     What are your concerns: PT WANTS TO CLARIFY HOW SHE SHOULD BE TAKING THIS MEDICATION, SHE STATES PHARMACY KEEPS GIVING HER 90 TABLETS SO SHE KEEPS RUNNING OUT FASTER.

## 2024-11-14 RX ORDER — GABAPENTIN 400 MG/1
CAPSULE ORAL
Qty: 120 CAPSULE | Refills: 5 | Status: SHIPPED | OUTPATIENT
Start: 2024-11-14

## 2025-05-29 ENCOUNTER — OFFICE VISIT (OUTPATIENT)
Dept: NEUROLOGY | Facility: CLINIC | Age: 35
End: 2025-05-29
Payer: MEDICARE

## 2025-05-29 VITALS
SYSTOLIC BLOOD PRESSURE: 113 MMHG | DIASTOLIC BLOOD PRESSURE: 82 MMHG | WEIGHT: 110 LBS | HEART RATE: 97 BPM | HEIGHT: 65 IN | BODY MASS INDEX: 18.33 KG/M2

## 2025-05-29 DIAGNOSIS — G62.1 ALCOHOL-INDUCED POLYNEUROPATHY: ICD-10-CM

## 2025-05-29 DIAGNOSIS — F10.11 HISTORY OF ETOH ABUSE: ICD-10-CM

## 2025-05-29 DIAGNOSIS — Z72.0 TOBACCO ABUSE: ICD-10-CM

## 2025-05-29 DIAGNOSIS — G62.9 POLYNEUROPATHY: Primary | ICD-10-CM

## 2025-05-29 PROCEDURE — 99214 OFFICE O/P EST MOD 30 MIN: CPT | Performed by: PSYCHIATRY & NEUROLOGY

## 2025-05-29 PROCEDURE — 3079F DIAST BP 80-89 MM HG: CPT | Performed by: PSYCHIATRY & NEUROLOGY

## 2025-05-29 PROCEDURE — 1160F RVW MEDS BY RX/DR IN RCRD: CPT | Performed by: PSYCHIATRY & NEUROLOGY

## 2025-05-29 PROCEDURE — 1159F MED LIST DOCD IN RCRD: CPT | Performed by: PSYCHIATRY & NEUROLOGY

## 2025-05-29 PROCEDURE — 3074F SYST BP LT 130 MM HG: CPT | Performed by: PSYCHIATRY & NEUROLOGY

## 2025-05-29 RX ORDER — GABAPENTIN 400 MG/1
400 CAPSULE ORAL 4 TIMES DAILY
Qty: 360 CAPSULE | Refills: 3 | Status: SHIPPED | OUTPATIENT
Start: 2025-05-29

## 2025-05-29 NOTE — PROGRESS NOTES
"Chief Complaint  Peripheral Neuropathy    Subjective          Lori MELVIN Ling presents to [facility] NEUROLOGY for POLYNEUROPATHY  History of Present Illness  Patient is here to f/u on polyneuropathy,she currently takes gabapentin 400 mg 4 caps qd    Unable to stand now. Knees lock up and feet are starting to curl up.     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 OV 8/16/23======  Patient is here to f/u on polyneuropathy she states she has more numbness in her legs since last visit   and a lot more pain at night she is      currently taking gabapentin 300 mg tid ( prescribed by her pcp)     Has involuntary eversion of the right foot at times   ===================  CHIEF COMPLAINT: Neuropathy      History of Present Illness Miss Ling is an extremely thin  female with a BMI of 18.54 who presented today with her  referred as a new patient for neuropathy that she states was from alcohol abuse.  The patient has had 2 admissions to [place] neurology for a complete lab, see care everywhere, and EMG study.  She also underwent treatments of IVIG thinking that she had a neuropathy that had an inflammatory neuropathy superimposed.  The patient's most recent admission to [facility] was on 4/14/2022.  She did undergo an EMG study at that time which showed a length dependent sensory peripheral neuropathy per Dr. Bo Aguilar.   She reports during these admissions she was told that her neuropathy was an alcohol induced neuropathy.  The patient states for approximately 3 years she was drinking 1 to 2 boxes of wine per day, \"drinking till I would pass out\".       The patient reports today that she has " "severe unrelenting burning tingling from upper chest to the rest of her body and below, she states she has difficulty urinating or having bowel movements because she cannot tell that she either needs to go to the restroom or has gone to the restroom.  She states she cannot walk more than a few steps due to pain in her feet.  She presented today riding in a wheelchair.  She states that the current gabapentin takes the edge off of her pain and that she does not want the gabapentin increased from the current 300 mg 3 times daily.  She states that she has greatly decreased her alcohol intake but is still drinking some.  She was notified that the neuropathy could get worse if she does not stop drinking alcohol.      ====================================       Current Outpatient Medications:     gabapentin (NEURONTIN) 400 MG capsule, Take 1 capsule by mouth 4 (Four) Times a Day., Disp: 360 capsule, Rfl: 3    metoprolol tartrate (LOPRESSOR) 25 MG tablet, TAKE ONE TABLET BY MOUTH TWICE A DAY, Disp: 180 tablet, Rfl: 1    pantoprazole (PROTONIX) 40 MG EC tablet, TAKE ONE TABLET BY MOUTH DAILY, Disp: 30 tablet, Rfl: 6    sertraline (ZOLOFT) 25 MG tablet, TAKE 1 TABLET BY MOUTH DAILY, Disp: 90 tablet, Rfl: 0    Review of Systems   HENT:  Positive for dental problem and sneezing.    Respiratory:  Positive for cough, shortness of breath and wheezing.    Endocrine: Positive for cold intolerance and heat intolerance.   Musculoskeletal:  Positive for back pain and neck pain.   Neurological:  Positive for weakness and numbness.          Objective:    Vital Signs:   /82   Pulse 97   Ht 165.1 cm (65\")   Wt 49.9 kg (110 lb)   BMI 18.30 kg/m²     Physical Exam  Vitals reviewed.   Pulmonary:      Effort: Pulmonary effort is normal. No respiratory distress.   Neurological:      Mental Status: She is alert.      Deep Tendon Reflexes:      Reflex Scores:       Bicep reflexes are 0 on the right side and 0 on the left side.       " Patellar reflexes are 0 on the right side and 0 on the left side.       Achilles reflexes are 0 on the right side and 0 on the left side.  Psychiatric:         Mood and Affect: Mood normal.        Result Review :                Neurological Exam  Mental Status  Alert.    Motor    Pt has good  hip movement bilateral, able to flex knees but unable to extend at knee  Can plantar flex ankles but limited dorsiflexion. .    Sensory  Proprioception abnormality:   Hyper sensitive to touch up to upper thighs, and  middle of abdomen and chest  Cannot fell position of feet or  lower extremity   Decreased sensation to about elbows .    Reflexes                                            Right                      Left  Biceps                                 0                         0  Patellar                                0                         0  Achilles                                0                         0        Assessment and Plan    Diagnoses and all orders for this visit:    1. Polyneuropathy (Primary)  -     PT Consult: Eval & Treat; Future    2. History of ETOH abuse    3. Tobacco abuse    4. Alcohol-induced polyneuropathy  -     gabapentin (NEURONTIN) 400 MG capsule; Take 1 capsule by mouth 4 (Four) Times a Day.  Dispense: 360 capsule; Refill: 3     Continue gabapentin for pain   Will order PT to assist with mobility  The neuropathy determined to be to etoh abuse after extensive testing appears to be stable  She has become more immobile with contractures at the knees over the past several years.     Follow Up   Return in about 1 year (around 5/29/2026).  Patient was given instructions and counseling regarding her condition or for health maintenance advice. Please see specific information pulled into the AVS if appropriate.     This document has been electronically signed by Joseph Seipel, MD on May 29, 2025 16:32 EDT